# Patient Record
Sex: FEMALE | Race: WHITE
[De-identification: names, ages, dates, MRNs, and addresses within clinical notes are randomized per-mention and may not be internally consistent; named-entity substitution may affect disease eponyms.]

---

## 2018-03-06 ENCOUNTER — HOSPITAL ENCOUNTER (OUTPATIENT)
Dept: HOSPITAL 92 - BICMAMMO | Age: 73
Discharge: HOME | End: 2018-03-06
Attending: INTERNAL MEDICINE
Payer: MEDICARE

## 2018-03-06 DIAGNOSIS — Z85.820: ICD-10-CM

## 2018-03-06 DIAGNOSIS — Z80.3: ICD-10-CM

## 2018-03-06 DIAGNOSIS — Z12.31: Primary | ICD-10-CM

## 2018-03-06 PROCEDURE — 77067 SCR MAMMO BI INCL CAD: CPT

## 2018-03-06 PROCEDURE — 77063 BREAST TOMOSYNTHESIS BI: CPT

## 2018-03-06 PROCEDURE — 77080 DXA BONE DENSITY AXIAL: CPT

## 2019-04-16 ENCOUNTER — HOSPITAL ENCOUNTER (OUTPATIENT)
Dept: HOSPITAL 92 - BICMAMMO | Age: 74
Discharge: HOME | End: 2019-04-16
Attending: INTERNAL MEDICINE
Payer: MEDICARE

## 2019-04-16 DIAGNOSIS — Z12.31: Primary | ICD-10-CM

## 2019-04-16 DIAGNOSIS — M81.0: ICD-10-CM

## 2019-04-16 DIAGNOSIS — M85.851: ICD-10-CM

## 2019-04-16 DIAGNOSIS — M85.852: ICD-10-CM

## 2019-04-16 PROCEDURE — 77067 SCR MAMMO BI INCL CAD: CPT

## 2019-04-16 PROCEDURE — 77080 DXA BONE DENSITY AXIAL: CPT

## 2019-04-16 PROCEDURE — 77063 BREAST TOMOSYNTHESIS BI: CPT

## 2019-04-16 NOTE — BD
BONE DENSITOMETRY USING DEXA:

 

HISTORY:

A 74-year-old female with screening for osteoporosis.

 

LUMBAR SPINE     BMD (g/cm2)     T-SCORE     Z-SCORE

RIGHT HIP

NECK             0.631           -2.0        0.1

TOTAL            0.919           -0.2        1.5

 

LEFT HIP

NECK             0.620           -2.1        0.0

TOTAL            0.890           -0.4        1.3

 

IMPRESSION:

Osteopenia.

 

POS: [facility]

## 2019-04-16 NOTE — MMO
Bilateral MAMMO Bilat Screen [facility].

 

CLINICAL HISTORY:

Patient is 74 years old and is seen for screening. The patient has the following

family history of breast cancer:  mother, at age 66 and maternal grandmother, at

age 48.  The patient has a history of melanoma in 2010.

 

VIEWS:

The views performed were:  bilateral craniocaudal with tomosynthesis and

bilateral mediolateral oblique with tomosynthesis.

 

FILMS COMPARED:

The present examination has been compared to prior imaging studies performed at

[facility] on 12/02/2010, 01/13/2012, 09/18/2013, 03/24/2015 and 03/06/2018,

and at [facility] on 04/09/2009.

 

MAMMOGRAM FINDINGS:

There are scattered fibroglandular densities.

 

There are no suspicious masses, suspicious calcifications, or new areas of

architectural distortion.

 

IMPRESSION:

THERE IS NO MAMMOGRAPHIC EVIDENCE OF MALIGNANCY.

 

A ROUTINE FOLLOW-UP MAMMOGRAM IN 1 YEAR IS RECOMMENDED.

 

THE RESULTS OF THIS EXAM WERE SENT TO THE PATIENT.

 

ACR BI-RADS Category 1 - Negative

 

MAMMOGRAPHY NOTE:

 1. A negative mammogram report should not delay a biopsy if a dominant of

 clinically suspicious mass is present.

 2. Approximately 10% to 15% of breast cancers are not detected by

 mammography.

 3. Adenosis and dense breasts may obscure an underlying neoplasm.